# Patient Record
Sex: FEMALE | Race: WHITE | URBAN - METROPOLITAN AREA
[De-identification: names, ages, dates, MRNs, and addresses within clinical notes are randomized per-mention and may not be internally consistent; named-entity substitution may affect disease eponyms.]

---

## 2024-10-28 ENCOUNTER — OFFICE VISIT (OUTPATIENT)
Dept: URBAN - METROPOLITAN AREA CLINIC 128 | Facility: CLINIC | Age: 24
End: 2024-10-28
Payer: COMMERCIAL

## 2024-10-28 ENCOUNTER — LAB OUTSIDE AN ENCOUNTER (OUTPATIENT)
Dept: URBAN - METROPOLITAN AREA CLINIC 128 | Facility: CLINIC | Age: 24
End: 2024-10-28

## 2024-10-28 ENCOUNTER — DASHBOARD ENCOUNTERS (OUTPATIENT)
Age: 24
End: 2024-10-28

## 2024-10-28 VITALS
HEART RATE: 83 BPM | HEIGHT: 64 IN | SYSTOLIC BLOOD PRESSURE: 110 MMHG | WEIGHT: 136 LBS | BODY MASS INDEX: 23.22 KG/M2 | TEMPERATURE: 97.3 F | DIASTOLIC BLOOD PRESSURE: 64 MMHG

## 2024-10-28 DIAGNOSIS — K21.9 GASTROESOPHAGEAL REFLUX DISEASE WITHOUT ESOPHAGITIS: ICD-10-CM

## 2024-10-28 DIAGNOSIS — R10.9 ABDOMINAL PAIN, UNSPECIFIED ABDOMINAL LOCATION: ICD-10-CM

## 2024-10-28 DIAGNOSIS — K76.0 FATTY LIVER: ICD-10-CM

## 2024-10-28 DIAGNOSIS — R74.8 ALKALINE PHOSPHATASE ELEVATION: ICD-10-CM

## 2024-10-28 PROBLEM — 266435005: Status: ACTIVE | Noted: 2024-10-28

## 2024-10-28 PROBLEM — 197321007: Status: ACTIVE | Noted: 2024-10-28

## 2024-10-28 PROCEDURE — 99204 OFFICE O/P NEW MOD 45 MIN: CPT | Performed by: PHYSICIAN ASSISTANT

## 2024-10-28 RX ORDER — PANTOPRAZOLE SODIUM 40 MG/1
1 TABLET 1/2 TO 1 HOUR BEFORE MORNING MEAL TABLET, DELAYED RELEASE ORAL ONCE A DAY
Status: ACTIVE | COMMUNITY

## 2024-10-28 NOTE — HPI-TODAY'S VISIT:
The patient is a new patient who went to [facility] ED on 10/[id number] for abdominal pain. RIght upper quadrant abdominal pain that began that morning with associated nausea and vomiting. Previous diagnosis of Gerd during her pregnancy. Described her pain as seven out of 10. White blood cell count 10.0, D dimer less than 215, urine pregnancy test negative, alk phos 135, total billirubin 0.3, AST 19, ALT 13, lipase 38. CT of the abdomen and pelvis was negative. Chest x-ray was negative. Right upper quadrant ultrasound revealed a normal gallbladder with no stones and the fatty liver was noted, no focal liver lesions noted, the visualized pancreas appears unremarkable. She was discharged home with her pantoprazole with refractory symptoms and states her symptoms have improved since. She has never had an EGD or colonoscopy. She has tried and failed pepcid and zantac in the past  Patient denies a family history of colon polyps or colon cancer or stomach cancer. Her mother has Crohns and GERD Patient denies any heart, lung, or kidney problems.  Patient denies any blood thinners or oxygen therapy. Denies any dialysis. Denies any pacemaker or defibrillator.  Was taking tylenol this past week. Drinks alcohol once a month socially.  Had a baby girl 03/4/25 and is currently pumping exclusively.

## 2024-10-28 NOTE — PHYSICAL EXAM GASTROINTESTINAL
Abdomen , soft, tenderness to epigastric and RUQ was noted, nondistended , no guarding or rigidity , no masses palpable , normal bowel sounds, negative Reddy's sign, negative Rovsing's sign, negative psoas and obturators signs, negative CVA tenderness bilaterally Liver and Spleen , no hepatosplenomegaly Rectal deferred

## 2024-10-29 ENCOUNTER — TELEPHONE ENCOUNTER (OUTPATIENT)
Dept: URBAN - METROPOLITAN AREA CLINIC 128 | Facility: CLINIC | Age: 24
End: 2024-10-29

## 2024-10-29 RX ORDER — ONDANSETRON HYDROCHLORIDE 8 MG/1
1 TABLET TABLET, FILM COATED ORAL
Qty: 30 | Refills: 0 | OUTPATIENT
Start: 2024-10-30

## 2024-10-30 LAB
ALBUMIN/GLOBULIN RATIO: 1.5
ALBUMIN: 3.8
ALKALINE PHOSPHATASE: 94
ALKALINE PHOSPHATASE: 94
ALT (SGPT): 9
AST (SGOT): 12
BILIRUBIN, DIRECT: 0.1
BILIRUBIN, INDIRECT: 0.2
BILIRUBIN, TOTAL: 0.3
BONE ISOENZYMES: 61
GLOBULIN: 2.6
IMMUNOGLOBULIN A, QN, SERUM: 384
INTERPRETATION: (no result)
INTESTINAL ISOENZYMES: 11
LIVER ISOENZYMES: 28
MACROHEPATIC ISOENZYMES: 0
PLACENTAL ISOENZYMES: 0
PROTEIN, TOTAL: 6.4
T-TRANSGLUTAMINASE (TTG) IGA: <1

## 2024-11-05 ENCOUNTER — CLAIMS CREATED FROM THE CLAIM WINDOW (OUTPATIENT)
Dept: URBAN - METROPOLITAN AREA CLINIC 4 | Facility: CLINIC | Age: 24
End: 2024-11-05
Payer: COMMERCIAL

## 2024-11-05 ENCOUNTER — OFFICE VISIT (OUTPATIENT)
Dept: URBAN - METROPOLITAN AREA SURGERY CENTER 31 | Facility: SURGERY CENTER | Age: 24
End: 2024-11-05
Payer: COMMERCIAL

## 2024-11-05 DIAGNOSIS — R11.2 ACUTE NAUSEA WITH NONBILIOUS VOMITING: ICD-10-CM

## 2024-11-05 DIAGNOSIS — K31.89 OTHER DISEASES OF STOMACH AND DUODENUM: ICD-10-CM

## 2024-11-05 DIAGNOSIS — K31.89 ACHYLIA: ICD-10-CM

## 2024-11-05 DIAGNOSIS — R10.13 ABDOMINAL DISCOMFORT, EPIGASTRIC: ICD-10-CM

## 2024-11-05 PROCEDURE — 43239 EGD BIOPSY SINGLE/MULTIPLE: CPT | Performed by: INTERNAL MEDICINE

## 2024-11-05 PROCEDURE — 88305 TISSUE EXAM BY PATHOLOGIST: CPT | Performed by: PATHOLOGY

## 2024-11-05 PROCEDURE — 00731 ANES UPR GI NDSC PX NOS: CPT | Performed by: NURSE ANESTHETIST, CERTIFIED REGISTERED

## 2024-11-05 RX ORDER — ONDANSETRON HYDROCHLORIDE 8 MG/1
1 TABLET TABLET, FILM COATED ORAL
Qty: 30 | Refills: 0 | Status: ACTIVE | COMMUNITY
Start: 2024-10-30

## 2024-11-05 RX ORDER — PANTOPRAZOLE SODIUM 40 MG/1
1 TABLET 1/2 TO 1 HOUR BEFORE MORNING MEAL TABLET, DELAYED RELEASE ORAL ONCE A DAY
Status: ACTIVE | COMMUNITY

## 2024-11-18 ENCOUNTER — LAB OUTSIDE AN ENCOUNTER (OUTPATIENT)
Dept: URBAN - METROPOLITAN AREA CLINIC 128 | Facility: CLINIC | Age: 24
End: 2024-11-18

## 2024-12-04 ENCOUNTER — OFFICE VISIT (OUTPATIENT)
Dept: URBAN - METROPOLITAN AREA CLINIC 128 | Facility: CLINIC | Age: 24
End: 2024-12-04
Payer: COMMERCIAL

## 2024-12-04 VITALS
TEMPERATURE: 97.9 F | HEART RATE: 77 BPM | SYSTOLIC BLOOD PRESSURE: 106 MMHG | HEIGHT: 64 IN | DIASTOLIC BLOOD PRESSURE: 68 MMHG | WEIGHT: 130 LBS | BODY MASS INDEX: 22.2 KG/M2

## 2024-12-04 DIAGNOSIS — K76.0 FATTY LIVER: ICD-10-CM

## 2024-12-04 DIAGNOSIS — R74.8 ALKALINE PHOSPHATASE ELEVATION: ICD-10-CM

## 2024-12-04 DIAGNOSIS — R10.9 ABDOMINAL PAIN, UNSPECIFIED ABDOMINAL LOCATION: ICD-10-CM

## 2024-12-04 DIAGNOSIS — K21.9 GASTROESOPHAGEAL REFLUX DISEASE WITHOUT ESOPHAGITIS: ICD-10-CM

## 2024-12-04 PROCEDURE — 99213 OFFICE O/P EST LOW 20 MIN: CPT | Performed by: PHYSICIAN ASSISTANT

## 2024-12-04 RX ORDER — ONDANSETRON HYDROCHLORIDE 8 MG/1
1 TABLET TABLET, FILM COATED ORAL
Qty: 30 | Refills: 0 | Status: ON HOLD | COMMUNITY
Start: 2024-10-30

## 2024-12-04 RX ORDER — PANTOPRAZOLE SODIUM 40 MG/1
1 TABLET 1/2 TO 1 HOUR BEFORE MORNING MEAL TABLET, DELAYED RELEASE ORAL ONCE A DAY
Status: ON HOLD | COMMUNITY

## 2024-12-04 NOTE — HPI-TODAY'S VISIT:
The patient is here for a follow up visit. Her GERD symptoms have now resolved, she if off of protonix. 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 Her GERD and abdominal pain has now ceased.  2024 EGD was normal.  Previously, she went to [facility] ED on 10/[id number] for abdominal pain. RIght upper quadrant abdominal pain that began that morning with associated nausea and vomiting. Previous diagnosis of Gerd during her pregnancy. Described her pain as seven out of 10. White blood cell count 10.0, D dimer less than 215, urine pregnancy test negative, alk phos 135, total billirubin 0.3, AST 19, ALT 13, lipase 38. CT of the abdomen and pelvis was negative. Chest x-ray was negative. Right upper quadrant ultrasound revealed a normal gallbladder with no stones and the fatty liver was noted, no focal liver lesions noted, the visualized pancreas appears unremarkable. She was discharged home with her pantoprazole with refractory symptoms and states her symptoms have improved since. She has never had an EGD or colonoscopy. She has tried and failed pepcid and zantac in the past  Patient denies a family history of colon polyps or colon cancer or stomach cancer. Her mother has Crohns and GERD Patient denies any heart, lung, or kidney problems.  Patient denies any blood thinners or oxygen therapy. Denies any dialysis. Denies any pacemaker or defibrillator.  Was taking tylenol this past week. Drinks alcohol once a month socially.  Had a baby girl 03/4/24 and is currently pumping exclusively.

## 2024-12-04 NOTE — PHYSICAL EXAM GASTROINTESTINAL
Abdomen , soft, non-tender, nondistended , no guarding or rigidity , no masses palpable , normal bowel sounds, negative Reddy's sign, negative Rovsing's sign, negative psoas and obturators signs, negative CVA tenderness bilaterally Liver and Spleen , no hepatosplenomegaly Rectal deferred